# Patient Record
Sex: MALE | Race: OTHER | URBAN - METROPOLITAN AREA
[De-identification: names, ages, dates, MRNs, and addresses within clinical notes are randomized per-mention and may not be internally consistent; named-entity substitution may affect disease eponyms.]

---

## 2021-03-09 ENCOUNTER — PREPPED CHART (OUTPATIENT)
Dept: URBAN - METROPOLITAN AREA CLINIC 24 | Facility: CLINIC | Age: 61
End: 2021-03-09

## 2022-04-12 ASSESSMENT — TONOMETRY
OD_IOP_MMHG: 19
OS_IOP_MMHG: 18

## 2022-04-19 ENCOUNTER — PREPPED CHART (OUTPATIENT)
Dept: URBAN - METROPOLITAN AREA CLINIC 24 | Facility: CLINIC | Age: 62
End: 2022-04-19

## 2022-04-19 ENCOUNTER — COMPREHENSIVE EXAM (OUTPATIENT)
Dept: URBAN - METROPOLITAN AREA CLINIC 24 | Facility: CLINIC | Age: 62
End: 2022-04-19

## 2022-04-19 DIAGNOSIS — H25.041: ICD-10-CM

## 2022-04-19 DIAGNOSIS — H52.222: ICD-10-CM

## 2022-04-19 DIAGNOSIS — H25.13: ICD-10-CM

## 2022-04-19 DIAGNOSIS — H52.4: ICD-10-CM

## 2022-04-19 DIAGNOSIS — H52.03: ICD-10-CM

## 2022-04-19 PROCEDURE — 92014 COMPRE OPH EXAM EST PT 1/>: CPT

## 2022-04-19 PROCEDURE — 92015 DETERMINE REFRACTIVE STATE: CPT

## 2022-04-19 ASSESSMENT — VISUAL ACUITY
OD_CC: 20/50
OS_CC: 20/30
OU_CC: 20/30
OU_CC: 20/25-3

## 2022-04-19 ASSESSMENT — TONOMETRY
OD_IOP_MMHG: 18
OS_IOP_MMHG: 19

## 2022-06-16 NOTE — PATIENT DISCUSSION
An updated prescription for glasses was given.
Monitor. Recheck cataract progression and BCVA in 12months.
NOT VISUALLY SIGNIFICANT: Informed patient that their cataract is not visually significant or does not meet the criteria for cataract surgery. Recommend attention to cataract symptoms monitoring by regular examinations. Patient instructed to call with changes in vision.
Patient given Rx for glasses.
Attending and PA/NP shared services statement (NON-critical care):
Attending and PA/NP shared services statement (NON-critical care):

## 2023-08-07 ENCOUNTER — ESTABLISHED PATIENT (OUTPATIENT)
Dept: URBAN - METROPOLITAN AREA CLINIC 24 | Facility: CLINIC | Age: 63
End: 2023-08-07

## 2023-08-07 DIAGNOSIS — Z01.01: ICD-10-CM

## 2023-08-07 DIAGNOSIS — H52.4: ICD-10-CM

## 2023-08-07 PROCEDURE — 92015 DETERMINE REFRACTIVE STATE: CPT

## 2023-08-07 PROCEDURE — 92012 INTRM OPH EXAM EST PATIENT: CPT

## 2023-08-07 ASSESSMENT — TONOMETRY
OD_IOP_MMHG: 18
OS_IOP_MMHG: 18

## 2023-08-07 ASSESSMENT — KERATOMETRY
OD_AXISANGLE2_DEGREES: 86
OD_K1POWER_DIOPTERS: 44.00
OS_AXISANGLE_DEGREES: 86
OS_AXISANGLE2_DEGREES: 176
OD_AXISANGLE_DEGREES: 176
OS_K2POWER_DIOPTERS: 44.00
OD_K2POWER_DIOPTERS: 47.50
OS_K1POWER_DIOPTERS: 43.25

## 2023-08-07 ASSESSMENT — VISUAL ACUITY
OD_SC: 20/150
OS_SC: 20/25
OU_CC: J1 @14"
OD_PH: 20/20
OU_SC: 20/30